# Patient Record
Sex: FEMALE | URBAN - METROPOLITAN AREA
[De-identification: names, ages, dates, MRNs, and addresses within clinical notes are randomized per-mention and may not be internally consistent; named-entity substitution may affect disease eponyms.]

---

## 2021-03-22 ENCOUNTER — TELEPHONE ENCOUNTER (OUTPATIENT)
Dept: URBAN - METROPOLITAN AREA CLINIC 35 | Facility: CLINIC | Age: 76
End: 2021-03-22

## 2021-03-23 ENCOUNTER — OFFICE VISIT (OUTPATIENT)
Dept: URBAN - METROPOLITAN AREA CLINIC 35 | Facility: CLINIC | Age: 76
End: 2021-03-23

## 2021-03-23 VITALS
WEIGHT: 124 LBS | BODY MASS INDEX: 21.17 KG/M2 | DIASTOLIC BLOOD PRESSURE: 70 MMHG | SYSTOLIC BLOOD PRESSURE: 118 MMHG | HEIGHT: 64 IN

## 2021-03-23 RX ORDER — CLONAZEPAM 0.5 MG/1
1 TABLET AT BEDTIME TABLET ORAL ONCE A DAY
Status: ACTIVE | COMMUNITY

## 2021-03-23 RX ORDER — TIMOLOL MALEATE 5 MG/ML
1 DROP INTO AFFECTED EYE SOLUTION OPHTHALMIC ONCE A DAY
Status: ACTIVE | COMMUNITY

## 2021-03-23 RX ORDER — POLYETHYLENE GLYCOL 3350, SODIUM SULFATE, SODIUM CHLORIDE, POTASSIUM CHLORIDE, ASCORBIC ACID, SODIUM ASCORBATE 140-9-5.2G
140 ML KIT ORAL BID
Qty: 1 KIT | Refills: 0 | OUTPATIENT
Start: 2021-03-23

## 2021-03-23 RX ORDER — ESCITALOPRAM 10 MG/1
1 TABLET TABLET, FILM COATED ORAL ONCE A DAY
Qty: 30 | Status: ACTIVE | COMMUNITY
Start: 2021-03-23

## 2021-03-23 RX ORDER — ATORVASTATIN CALCIUM 10 MG/1
1 TABLET TABLET, FILM COATED ORAL ONCE A DAY
Qty: 30 | Status: ACTIVE | COMMUNITY

## 2021-03-23 NOTE — HPI-MIGRATED HPI
;     Colorectal Cancer Screening : Patient is a 75 year old female whom presents today for consultation for a colonoscopy.  Patient denies a family history of colon polyps and cancers.  Patient currently admits 1-2 BMs per day. Stools are normal without melena, blood, or mucus.   No abdominal pain. No weight loss No CP, SOB or fever. No blood thinners. No sleep apnea No cardiac or pulmonary issues   Patient's last colonoscopy 10 yrs ago and it was NL.   Patient is scheduled for cataract surgery April 9th. ;

## 2021-03-23 NOTE — HPI-MIGRATED HPI
;     Colorectal Cancer Screening : Patient is a 75 year old female whom presents today for consultation for a colonoscopy.  Patient admits/denies a family history of colon polyps and cancers.  Patient currently admits __ BMs per day. Stools are-- with/without melena, blood, or mucus.   No abdominal pain. No weight loss No CP, SOB or fever. No blood thinners. No sleep apnea No cardiac or pulmonary issues ;

## 2021-03-26 ENCOUNTER — TELEPHONE ENCOUNTER (OUTPATIENT)
Dept: URBAN - METROPOLITAN AREA CLINIC 35 | Facility: CLINIC | Age: 76
End: 2021-03-26

## 2021-03-26 RX ORDER — POLYETHYLENE GLYCOL 3350, SODIUM SULFATE, SODIUM CHLORIDE, POTASSIUM CHLORIDE, ASCORBIC ACID, SODIUM ASCORBATE 140-9-5.2G
140 ML KIT ORAL BID
Qty: 1 KIT | Refills: 0 | OUTPATIENT
Start: 2021-03-23

## 2021-04-01 ENCOUNTER — TELEPHONE ENCOUNTER (OUTPATIENT)
Dept: URBAN - METROPOLITAN AREA CLINIC 35 | Facility: CLINIC | Age: 76
End: 2021-04-01

## 2021-04-07 ENCOUNTER — TELEPHONE ENCOUNTER (OUTPATIENT)
Dept: URBAN - METROPOLITAN AREA CLINIC 35 | Facility: CLINIC | Age: 76
End: 2021-04-07

## 2021-04-19 ENCOUNTER — OFFICE VISIT (OUTPATIENT)
Dept: URBAN - METROPOLITAN AREA SURGERY CENTER 8 | Facility: SURGERY CENTER | Age: 76
End: 2021-04-19

## 2021-04-26 ENCOUNTER — TELEPHONE ENCOUNTER (OUTPATIENT)
Dept: URBAN - METROPOLITAN AREA CLINIC 35 | Facility: CLINIC | Age: 76
End: 2021-04-26

## 2021-05-04 ENCOUNTER — OFFICE VISIT (OUTPATIENT)
Dept: URBAN - METROPOLITAN AREA CLINIC 35 | Facility: CLINIC | Age: 76
End: 2021-05-04

## 2021-05-04 NOTE — HPI-MIGRATED HPI
;     Colorectal Cancer Screening : Patient presents today for follow up of his colonoscopy.  Patient admits/denies any complications after his/her procedure. Patient currently admits __ bowel movements ___.  Stools are __.  Patient denies any melena, blood or mucus in stools.  Last visit (03/23/2021): Patient is a 75 year old female whom presents today for consultation for a colonoscopy.  Patient denies a family history of colon polyps and cancers.  Patient currently admits 1-2 BMs per day. Stools are normal without melena, blood, or mucus.   No abdominal pain. No weight loss No CP, SOB or fever. No blood thinners. No sleep apnea No cardiac or pulmonary issues   Patient's last colonoscopy 10 yrs ago and it was NL.   Patient is scheduled for cataract surgery April 9th.;